# Patient Record
Sex: FEMALE | Race: AMERICAN INDIAN OR ALASKA NATIVE
[De-identification: names, ages, dates, MRNs, and addresses within clinical notes are randomized per-mention and may not be internally consistent; named-entity substitution may affect disease eponyms.]

---

## 2019-04-24 ENCOUNTER — HOSPITAL ENCOUNTER (EMERGENCY)
Dept: HOSPITAL 5 - ED | Age: 29
Discharge: LEFT BEFORE DISCHARGE | End: 2019-04-24
Payer: SELF-PAY

## 2019-04-24 VITALS — DIASTOLIC BLOOD PRESSURE: 101 MMHG | SYSTOLIC BLOOD PRESSURE: 146 MMHG

## 2019-04-24 DIAGNOSIS — Z53.21: ICD-10-CM

## 2019-04-24 DIAGNOSIS — N64.4: Primary | ICD-10-CM

## 2019-04-24 LAB
BILIRUB UR QL STRIP: (no result)
BLOOD UR QL VISUAL: (no result)
MUCOUS THREADS #/AREA URNS HPF: (no result) /HPF
PH UR STRIP: 5 [PH] (ref 5–7)
PROT UR STRIP-MCNC: (no result) MG/DL
RBC #/AREA URNS HPF: 41 /HPF (ref 0–6)
UROBILINOGEN UR-MCNC: 4 MG/DL (ref ?–2)
WBC #/AREA URNS HPF: 8 /HPF (ref 0–6)

## 2019-04-24 PROCEDURE — 81025 URINE PREGNANCY TEST: CPT

## 2019-04-24 PROCEDURE — 81001 URINALYSIS AUTO W/SCOPE: CPT

## 2019-04-24 NOTE — EMERGENCY DEPARTMENT REPORT
Blank Doc





- Documentation


Documentation: 





This is a [id number] female that presents with left breast pain.  Stated feels 

a nodular mass to left breast.








This initial assessment/diagnostic orders/clinical plan/treatment(s) is/are 

subject to change based on patient's health status, clinical progression and 

re-assessment by fellow clinical providers in the ED.  Further treatment and 

workup at subsequent clinical providers discretion.  Patient/guardians urged not

to elope from the ED as their condition may be serious if not clinically 

assessed and managed.  Initial orders include:


1- Patient sent to [facility] for further evaluation and treatment


2- [facility]